# Patient Record
Sex: MALE | Race: WHITE | URBAN - METROPOLITAN AREA
[De-identification: names, ages, dates, MRNs, and addresses within clinical notes are randomized per-mention and may not be internally consistent; named-entity substitution may affect disease eponyms.]

---

## 2017-01-31 ENCOUNTER — MYC REFILL (OUTPATIENT)
Dept: CARDIOLOGY | Facility: CLINIC | Age: 57
End: 2017-01-31

## 2017-01-31 DIAGNOSIS — Z95.1 S/P CABG (CORONARY ARTERY BYPASS GRAFT): ICD-10-CM

## 2017-02-06 RX ORDER — ATORVASTATIN CALCIUM 40 MG/1
40 TABLET, FILM COATED ORAL DAILY
Qty: 90 TABLET | Refills: 2 | Status: SHIPPED | OUTPATIENT
Start: 2017-02-06 | End: 2018-01-25

## 2017-02-06 RX ORDER — METOPROLOL TARTRATE 50 MG
50 TABLET ORAL 2 TIMES DAILY
Qty: 60 TABLET | Refills: 11 | Status: SHIPPED | OUTPATIENT
Start: 2017-02-06

## 2017-02-06 NOTE — TELEPHONE ENCOUNTER
Message from [facility]:  Slim Chaparro RN Tue Jan 31, 2017 11:51 AM        ----- Message -----   From: Mik Cleveland   Sent: 1/31/2017 6:45 AM   To: Cardiology [facility]  Subject: Medication Renewal Request     Original authorizing provider: MD Mik Mercado would like a refill of the following medications:  metoprolol (LOPRESSOR) 50 MG tablet [Jadyn Pickett MD]  atorvastatin (LIPITOR) 40 MG tablet [Jadyn Pickett MD]    Preferred pharmacy: [facility] [address]    Comment:

## 2017-02-08 ENCOUNTER — RADIANT APPOINTMENT (OUTPATIENT)
Dept: GENERAL RADIOLOGY | Facility: CLINIC | Age: 57
End: 2017-02-08
Attending: INTERNAL MEDICINE

## 2017-02-08 ENCOUNTER — OFFICE VISIT (OUTPATIENT)
Dept: FAMILY MEDICINE | Facility: CLINIC | Age: 57
End: 2017-02-08

## 2017-02-08 VITALS
DIASTOLIC BLOOD PRESSURE: 76 MMHG | BODY MASS INDEX: 26.49 KG/M2 | WEIGHT: 206.4 LBS | OXYGEN SATURATION: 96 % | TEMPERATURE: 97.3 F | HEART RATE: 67 BPM | HEIGHT: 74 IN | SYSTOLIC BLOOD PRESSURE: 126 MMHG

## 2017-02-08 DIAGNOSIS — J18.9 PNEUMONIA DUE TO INFECTIOUS ORGANISM, UNSPECIFIED LATERALITY, UNSPECIFIED PART OF LUNG: ICD-10-CM

## 2017-02-08 DIAGNOSIS — J40 BRONCHITIS: ICD-10-CM

## 2017-02-08 DIAGNOSIS — I10 BENIGN ESSENTIAL HYPERTENSION: Primary | ICD-10-CM

## 2017-02-08 DIAGNOSIS — Z95.1 S/P CABG (CORONARY ARTERY BYPASS GRAFT): ICD-10-CM

## 2017-02-08 LAB
ERYTHROCYTE [DISTWIDTH] IN BLOOD BY AUTOMATED COUNT: 15.9 % (ref 10–15)
HCT VFR BLD AUTO: 44.3 % (ref 40–53)
HGB BLD-MCNC: 14.7 G/DL (ref 13.3–17.7)
MCH RBC QN AUTO: 27.8 PG (ref 26.5–33)
MCHC RBC AUTO-ENTMCNC: 33.2 G/DL (ref 31.5–36.5)
MCV RBC AUTO: 84 FL (ref 78–100)
PLATELET # BLD AUTO: 139 10E9/L (ref 150–450)
RBC # BLD AUTO: 5.28 10E12/L (ref 4.4–5.9)
WBC # BLD AUTO: 10.5 10E9/L (ref 4–11)

## 2017-02-08 PROCEDURE — 99214 OFFICE O/P EST MOD 30 MIN: CPT | Performed by: INTERNAL MEDICINE

## 2017-02-08 PROCEDURE — 71020 XR CHEST 2 VW: CPT

## 2017-02-08 PROCEDURE — 85027 COMPLETE CBC AUTOMATED: CPT | Performed by: INTERNAL MEDICINE

## 2017-02-08 PROCEDURE — 36415 COLL VENOUS BLD VENIPUNCTURE: CPT | Performed by: INTERNAL MEDICINE

## 2017-02-08 NOTE — NURSING NOTE
"Chief Complaint   Patient presents with     Cough       Initial /76 mmHg  Pulse 67  Temp(Src) 97.3  F (36.3  C) (Oral)  Ht 6' 2\" (1.88 m)  Wt 206 lb 6.4 oz (93.622 kg)  BMI 26.49 kg/m2  SpO2 96% Estimated body mass index is 26.49 kg/(m^2) as calculated from the following:    Height as of this encounter: 6' 2\" (1.88 m).    Weight as of this encounter: 206 lb 6.4 oz (93.622 kg).  Medication Reconciliation: complete.  Right arm, manual.  Monica Sofia CMA    "

## 2017-02-08 NOTE — PROGRESS NOTES
SUBJECTIVE:                                                    Mik Cleveland is a 56 year old male who has a history of pneumonia presents to clinic today for the following health issues:    Mr. Cleveland presents to the clinic for evaluation of flu-like symptoms. He reports experiencing rhinorrhea andf sore throat one week ago that slightly resolved. However, patient reports worsening symptoms over the past two days with development of shortness of breath, low grade fever (~100 degrees F), bilateral ear pain and congestion, chills and night sweats. He also reports having difficulty sleeping with these symptoms. Denies wheezing and paroxymal coughing. He has attempted Asprin for management of symptoms. Of note, he reports being exposed to 2 coworkers who developed pneumonia in the last 2 weeks.    RESPIRATORY SYMPTOMS      Duration: 1 wk    Description  nasal congestion, cough, fever, chills and ear pain bilateral    Severity: moderate    Accompanying signs and symptoms: None    History (predisposing factors):  Had heart surgery end of Aug    Precipitating or alleviating factors: None    Therapies tried and outcome:  asa       Problem list and histories reviewed & adjusted, as indicated.  Additional history: as documented    Current Outpatient Prescriptions   Medication Sig Dispense Refill     metoprolol (LOPRESSOR) 50 MG tablet Take 1 tablet (50 mg) by mouth 2 times daily 60 tablet 11     atorvastatin (LIPITOR) 40 MG tablet Take 1 tablet (40 mg) by mouth daily 90 tablet 2     aspirin 325 MG tablet 1 tablet (325 mg) by Oral or NG Tube route daily 30 tablet 0     multivitamin, therapeutic with minerals (MULTI-VITAMIN) TABS Take 1 tablet by mouth daily       No Known Allergies    ROS:  Constitutional, HEENT, cardiovascular, pulmonary, gi and gu systems are negative, except as otherwise noted.    This document serves as a record of the services and decisions personally performed and made by Sung Ornelas MD. It was  "created on his/her behalf by Rama Estrella, a trained medical scribe. The creation of this document is based the provider's statements to the medical scribe.  Halibsudhir Estrella 7:57 AM, February 8, 2017      OBJECTIVE:                                                    /76 mmHg  Pulse 67  Temp(Src) 97.3  F (36.3  C) (Oral)  Ht 1.88 m (6' 2\")  Wt 93.622 kg (206 lb 6.4 oz)  BMI 26.49 kg/m2  SpO2 96%  Body mass index is 26.49 kg/(m^2).   HEENT: streaky posterior pharyngeal erythema,   Neck was supple without adenopathy or thyromegaly his carotids were normal without bruits  Chest course expiratory breath sounds  Cardiovascular S1 and S2 are physiologic without murmurs or gallops  Abdomen bowel sounds were normal.  There is no palpable mass or organomegaly  Extremities nontender without any edema  Pulses pedal pulses are as described otherwise his pulses are bilaterally symmetrical throughout without bruits  Skin without significant abnormality     Chest Xray reviewed - unremarkable     ASSESSMENT/PLAN:                                                    1. Benign essential hypertension      2. S/P CABG (coronary artery bypass graft)      3. Pneumonia due to infectious organism, unspecified laterality, unspecified part of lung    - XR Chest 2 Views; Future  - CBC with platelets  - JUST IN CASE    4. Bronchitis  Patient suspicions for pneumonia have been ruled out however his symptoms do it in ongoing bronchitis in the course is consistent with a secondary bacterial source. Patient will be started on antibiotics use Robitussin DM as necessary and follow-up PRN problems  - amoxicillin-clavulanate (AUGMENTIN) 875-125 MG per tablet; Take 1 tablet by mouth 2 times daily  Dispense: 14 tablet; Refill: 0    The information in this document, created by the medical scribe for me, accurately reflects the services I personally performed and the decisions made by me. I have reviewed and approved this document for " accuracy prior to leaving the patient care area.  Sung Ornelas MD  8:07 AM, 02/08/2017     Sung Ornelas MD  [facility]

## 2018-01-24 ENCOUNTER — TELEPHONE (OUTPATIENT)
Dept: FAMILY MEDICINE | Facility: CLINIC | Age: 58
End: 2018-01-24

## 2018-01-25 DIAGNOSIS — Z95.1 S/P CABG (CORONARY ARTERY BYPASS GRAFT): ICD-10-CM

## 2018-01-30 RX ORDER — ATORVASTATIN CALCIUM 40 MG/1
40 TABLET, FILM COATED ORAL DAILY
Qty: 90 TABLET | Refills: 0 | Status: SHIPPED | OUTPATIENT
Start: 2018-01-30

## 2018-01-30 NOTE — TELEPHONE ENCOUNTER
Received MyChart refill request for atorvastatin. Patient is overdue for f/u with Dr. Pickett. Also, it looks like she originally ordered an echo to be done this year, but then when patient saw Dr. Khan he changed the echo to a cardiac MRI. Patient will need testing & labs & f/u with Dr. Pickett for further refills. Called patient, left VM stating that he is overdue for f/u and requested a call back. In the meantime, refill for atorvastatin sent for 90 days, 0 refill.        Per Dr. Khan note:    My recommendation is to repeat cardiac imaging next year.  Due to a family history of ventricular ectopic beats (the patient provides good history for PVCs in several family members), I will change the echocardiogram that Dr. Pickett ordered for next fall to a cardiac MRI.  I will also repeat a 24-hour Holter monitor.  If there is a change in LV function, or if symptoms develop we would entertain the option of PVC ablation.       RECOMMENDATIONS:   A.  Cardiac MRI and a 24-hour Holter monitor in 1 year.   B.  I will see him in followup in 1 year.     C.  I discussed with Mr. Cleveland typical symptoms that can occur with PVCs and asked him to give our office a call should he develop any of these over the next year.       Thank you very much for giving me the opportunity to evaluate this delightful gentleman.  Please feel free to contact me with questions or concerns.       Sincerely,          JUSTIN KHAN MD, FACC

## 2020-03-10 ENCOUNTER — HEALTH MAINTENANCE LETTER (OUTPATIENT)
Age: 60
End: 2020-03-10

## 2020-12-27 ENCOUNTER — HEALTH MAINTENANCE LETTER (OUTPATIENT)
Age: 60
End: 2020-12-27

## 2021-04-24 ENCOUNTER — HEALTH MAINTENANCE LETTER (OUTPATIENT)
Age: 61
End: 2021-04-24

## 2021-10-09 ENCOUNTER — HEALTH MAINTENANCE LETTER (OUTPATIENT)
Age: 61
End: 2021-10-09

## 2022-05-16 ENCOUNTER — HEALTH MAINTENANCE LETTER (OUTPATIENT)
Age: 62
End: 2022-05-16

## 2022-09-11 ENCOUNTER — HEALTH MAINTENANCE LETTER (OUTPATIENT)
Age: 62
End: 2022-09-11

## 2023-06-03 ENCOUNTER — HEALTH MAINTENANCE LETTER (OUTPATIENT)
Age: 63
End: 2023-06-03